# Patient Record
Sex: MALE | Race: WHITE | NOT HISPANIC OR LATINO | Employment: OTHER | URBAN - NONMETROPOLITAN AREA
[De-identification: names, ages, dates, MRNs, and addresses within clinical notes are randomized per-mention and may not be internally consistent; named-entity substitution may affect disease eponyms.]

---

## 2022-09-29 ENCOUNTER — HOSPITAL ENCOUNTER (EMERGENCY)
Facility: HOSPITAL | Age: 82
Discharge: HOME | End: 2022-09-29
Attending: NURSE PRACTITIONER | Admitting: NURSE PRACTITIONER
Payer: COMMERCIAL

## 2022-09-29 VITALS
TEMPERATURE: 96.1 F | SYSTOLIC BLOOD PRESSURE: 151 MMHG | OXYGEN SATURATION: 98 % | HEART RATE: 87 BPM | DIASTOLIC BLOOD PRESSURE: 87 MMHG | RESPIRATION RATE: 16 BRPM

## 2022-09-29 DIAGNOSIS — L30.9 ECZEMA: ICD-10-CM

## 2022-09-29 DIAGNOSIS — L30.9 ECZEMA, UNSPECIFIED TYPE: ICD-10-CM

## 2022-09-29 PROCEDURE — 99213 OFFICE O/P EST LOW 20 MIN: CPT | Performed by: NURSE PRACTITIONER

## 2022-09-29 PROCEDURE — 250N000011 HC RX IP 250 OP 636: Performed by: NURSE PRACTITIONER

## 2022-09-29 PROCEDURE — 96372 THER/PROPH/DIAG INJ SC/IM: CPT | Performed by: NURSE PRACTITIONER

## 2022-09-29 PROCEDURE — G0463 HOSPITAL OUTPT CLINIC VISIT: HCPCS | Mod: 25

## 2022-09-29 RX ORDER — CEPHALEXIN 500 MG/1
500 CAPSULE ORAL 4 TIMES DAILY
Qty: 40 CAPSULE | Refills: 0 | Status: SHIPPED | OUTPATIENT
Start: 2022-09-29 | End: 2022-10-09

## 2022-09-29 RX ORDER — PREDNISONE 20 MG/1
TABLET ORAL
Qty: 10 TABLET | Refills: 0 | Status: SHIPPED | OUTPATIENT
Start: 2022-09-29

## 2022-09-29 RX ORDER — BENZOCAINE/MENTHOL 6 MG-10 MG
LOZENGE MUCOUS MEMBRANE 2 TIMES DAILY
Qty: 30 G | Refills: 3 | Status: SHIPPED | OUTPATIENT
Start: 2022-09-29

## 2022-09-29 RX ORDER — DEXAMETHASONE SODIUM PHOSPHATE 10 MG/ML
10 INJECTION, SOLUTION INTRAMUSCULAR; INTRAVENOUS ONCE
Status: COMPLETED | OUTPATIENT
Start: 2022-09-29 | End: 2022-09-29

## 2022-09-29 RX ADMIN — DEXAMETHASONE SODIUM PHOSPHATE 10 MG: 10 INJECTION, SOLUTION INTRAMUSCULAR; INTRAVENOUS at 14:43

## 2022-09-29 ASSESSMENT — ACTIVITIES OF DAILY LIVING (ADL): ADLS_ACUITY_SCORE: 35

## 2022-09-29 ASSESSMENT — ENCOUNTER SYMPTOMS
MUSCULOSKELETAL NEGATIVE: 1
FEVER: 0
NUMBNESS: 0
PSYCHIATRIC NEGATIVE: 1

## 2022-09-29 NOTE — ED PROVIDER NOTES
History     Chief Complaint   Patient presents with     Rash     HPI  Alec Pemberton is a 82 year old male who has scaly, red rash full circumference of both arms to shoulder., and on front of lower legs. Rash is itchy. Started 4 weeks ago after exposure to poison oak.  No fevers, . Started out with pimply rash, that developed into raw, red, oozing.  Now red, and scaly and itchy.Has been using Benadryl, and benadryl cream.    States did have eczema as a child.      Allergies:  No Known Allergies    Problem List:    There are no problems to display for this patient.       Past Medical History:    History reviewed. No pertinent past medical history.    Past Surgical History:    History reviewed. No pertinent surgical history.    Family History:    History reviewed. No pertinent family history.    Social History:  Marital Status:   [2]        Medications:    cephALEXin (KEFLEX) 500 MG capsule  hydrocortisone (CORTAID) 1 % external cream  predniSONE (DELTASONE) 20 MG tablet          Review of Systems   Constitutional: Negative for fever.   Musculoskeletal: Negative.    Skin: Positive for rash.        Red scaly rash on both arms and front of both legs.     Neurological: Negative for numbness.   Psychiatric/Behavioral: Negative.        Physical Exam   BP: 151/87  Pulse: 87  Temp: (!) 96.1  F (35.6  C)  Resp: 16  SpO2: 98 %      Physical Exam  Constitutional:       General: He is not in acute distress.  Neck:      Comments: No axilla lymphadenopathy    Cardiovascular:      Rate and Rhythm: Normal rate and regular rhythm.      Heart sounds: Normal heart sounds. No murmur heard.  Pulmonary:      Effort: Pulmonary effort is normal.      Breath sounds: Normal breath sounds.   Musculoskeletal:         General: Normal range of motion.      Cervical back: Normal range of motion and neck supple.   Lymphadenopathy:      Cervical: No cervical adenopathy.   Skin:     General: Skin is warm and dry.      Findings: Erythema and  rash present.      Comments: Red base of skin, and scaly skin on both arms, full circumference.  Upper arms have more hive like appearance.  Legs have red dots with scratches to front of shins.       Neurological:      General: No focal deficit present.      Mental Status: He is alert and oriented to person, place, and time.   Psychiatric:         Mood and Affect: Mood normal.         Behavior: Behavior normal.         ED Course                 Procedures       Decadron 10mg. IM            No results found for this or any previous visit (from the past 24 hour(s)).    Medications   dexamethasone PF (DECADRON) injection 10 mg (10 mg Intramuscular Given 9/29/22 1443)       Assessments & Plan (with Medical Decision Making)     I have reviewed the nursing notes.    I have reviewed the findings, diagnosis, plan and need for follow up with the patient.      New Prescriptions    CEPHALEXIN (KEFLEX) 500 MG CAPSULE    Take 1 capsule (500 mg) by mouth 4 times daily for 10 days    HYDROCORTISONE (CORTAID) 1 % EXTERNAL CREAM    Apply topically 2 times daily    PREDNISONE (DELTASONE) 20 MG TABLET    Take two tablets (= 40mg) each day for 5 (five) days       Final diagnoses:   Eczema   ASSESSMENT / PLAN:  (L30.9) Eczema  Comment: Looks like severe reaction after exposure to poison oak.    Plan:    1. Got Decadron(prednisone) Injection today  2. Start tomorrow,   Prednisone 40mg (2  20mg pills) a day for 5 days    3.  Get emollient cream  (One with Glycyrrhetinic Acid the best) to apply to skin 2 times a day  4. Hydrocortisone cream to affected area 2 times a day thinly after done with steroid pills.   5. Referral to Dermatology at [facility]   [phone]  6.   Keflex 500mg  4 times a day for 10 days.     Adult Dermatology Referral                9/29/2022   [facility]     Jarrett Gonzalez NP  09/29/22 [id number]

## 2022-09-29 NOTE — ED TRIAGE NOTES
Pt presents with c/o rash on both arms and bilaterally on legs. States that it started about 4 weeks ago. Reports a benadryl cream, taken benadryl, all with no relief.  The rash is brown,dry and scaly with redness under neath that ranges from the shoulders down to the tops of both hands.

## 2022-09-29 NOTE — DISCHARGE INSTRUCTIONS
Got Decadron(prednisone) Injection today  Start tomorrow,   Prednisone 40mg (2  20mg pills) a day for 5 days     Get emollient cream  (One with Glycyrrhetinic Acid the best) to apply to skin 2 times a day  Hydrocortisone cream to affected area 2 times a day thinly after done with steroid pills.   Referral to Dermatology at [facility]   [phone]   Keflex 500mg  4 times a day for 10 days.